# Patient Record
Sex: MALE | Race: WHITE | Employment: FULL TIME | URBAN - METROPOLITAN AREA
[De-identification: names, ages, dates, MRNs, and addresses within clinical notes are randomized per-mention and may not be internally consistent; named-entity substitution may affect disease eponyms.]

---

## 2019-04-11 ENCOUNTER — HOSPITAL ENCOUNTER (EMERGENCY)
Age: 39
Discharge: ANOTHER HOSPITAL | End: 2019-04-12
Attending: EMERGENCY MEDICINE
Payer: COMMERCIAL

## 2019-04-11 ENCOUNTER — APPOINTMENT (OUTPATIENT)
Dept: CT IMAGING | Age: 39
End: 2019-04-11
Attending: EMERGENCY MEDICINE
Payer: COMMERCIAL

## 2019-04-11 DIAGNOSIS — M54.42 ACUTE LEFT-SIDED LOW BACK PAIN WITH LEFT-SIDED SCIATICA: Primary | ICD-10-CM

## 2019-04-11 DIAGNOSIS — M79.2 NEURITIS: ICD-10-CM

## 2019-04-11 LAB
COMMENT, HOLDF: NORMAL
SAMPLES BEING HELD,HOLD: NORMAL

## 2019-04-11 PROCEDURE — 74011000250 HC RX REV CODE- 250: Performed by: EMERGENCY MEDICINE

## 2019-04-11 PROCEDURE — 72131 CT LUMBAR SPINE W/O DYE: CPT

## 2019-04-11 PROCEDURE — 51798 US URINE CAPACITY MEASURE: CPT

## 2019-04-11 PROCEDURE — 96375 TX/PRO/DX INJ NEW DRUG ADDON: CPT

## 2019-04-11 PROCEDURE — 74011250637 HC RX REV CODE- 250/637: Performed by: EMERGENCY MEDICINE

## 2019-04-11 PROCEDURE — 96372 THER/PROPH/DIAG INJ SC/IM: CPT

## 2019-04-11 PROCEDURE — 96374 THER/PROPH/DIAG INJ IV PUSH: CPT

## 2019-04-11 PROCEDURE — 36415 COLL VENOUS BLD VENIPUNCTURE: CPT

## 2019-04-11 PROCEDURE — 96376 TX/PRO/DX INJ SAME DRUG ADON: CPT

## 2019-04-11 PROCEDURE — 74011250636 HC RX REV CODE- 250/636: Performed by: EMERGENCY MEDICINE

## 2019-04-11 PROCEDURE — 99285 EMERGENCY DEPT VISIT HI MDM: CPT

## 2019-04-11 RX ORDER — KETOROLAC TROMETHAMINE 30 MG/ML
30 INJECTION, SOLUTION INTRAMUSCULAR; INTRAVENOUS
Status: COMPLETED | OUTPATIENT
Start: 2019-04-11 | End: 2019-04-11

## 2019-04-11 RX ORDER — METHOCARBAMOL 500 MG/1
750 TABLET, FILM COATED ORAL 4 TIMES DAILY
Status: DISCONTINUED | OUTPATIENT
Start: 2019-04-11 | End: 2019-04-12 | Stop reason: HOSPADM

## 2019-04-11 RX ORDER — OXYCODONE AND ACETAMINOPHEN 5; 325 MG/1; MG/1
1 TABLET ORAL
Status: COMPLETED | OUTPATIENT
Start: 2019-04-11 | End: 2019-04-11

## 2019-04-11 RX ORDER — LIDOCAINE 40 MG/G
CREAM TOPICAL
Status: COMPLETED | OUTPATIENT
Start: 2019-04-11 | End: 2019-04-11

## 2019-04-11 RX ADMIN — LIDOCAINE: 40 CREAM TOPICAL at 23:13

## 2019-04-11 RX ADMIN — KETOROLAC TROMETHAMINE 30 MG: 30 INJECTION, SOLUTION INTRAMUSCULAR at 23:12

## 2019-04-11 RX ADMIN — OXYCODONE AND ACETAMINOPHEN 1 TABLET: 5; 325 TABLET ORAL at 23:13

## 2019-04-11 RX ADMIN — METHOCARBAMOL TABLETS 750 MG: 500 TABLET, COATED ORAL at 23:13

## 2019-04-12 ENCOUNTER — TELEPHONE (OUTPATIENT)
Dept: NEUROLOGY | Age: 39
End: 2019-04-12

## 2019-04-12 VITALS
SYSTOLIC BLOOD PRESSURE: 137 MMHG | HEART RATE: 91 BPM | OXYGEN SATURATION: 97 % | TEMPERATURE: 98.1 F | DIASTOLIC BLOOD PRESSURE: 51 MMHG | HEIGHT: 70 IN | WEIGHT: 165 LBS | RESPIRATION RATE: 16 BRPM | BODY MASS INDEX: 23.62 KG/M2

## 2019-04-12 LAB
ANION GAP BLD CALC-SCNC: 17 MMOL/L (ref 10–20)
BASOPHILS # BLD: 0 K/UL (ref 0–0.1)
BASOPHILS NFR BLD: 0 % (ref 0–1)
BUN BLD-MCNC: 10 MG/DL (ref 9–20)
CA-I BLD-MCNC: 1.16 MMOL/L (ref 1.12–1.32)
CHLORIDE BLD-SCNC: 100 MMOL/L (ref 98–107)
CO2 BLD-SCNC: 27 MMOL/L (ref 21–32)
CREAT BLD-MCNC: 0.8 MG/DL (ref 0.6–1.3)
DIFFERENTIAL METHOD BLD: ABNORMAL
EOSINOPHIL # BLD: 0.1 K/UL (ref 0–0.4)
EOSINOPHIL NFR BLD: 2 % (ref 0–7)
ERYTHROCYTE [DISTWIDTH] IN BLOOD BY AUTOMATED COUNT: 12.3 % (ref 11.5–14.5)
GLUCOSE BLD-MCNC: 110 MG/DL (ref 65–100)
HCT VFR BLD AUTO: 41.9 % (ref 36.6–50.3)
HCT VFR BLD CALC: 44 % (ref 36.6–50.3)
HGB BLD-MCNC: 14.4 G/DL (ref 12.1–17)
LYMPHOCYTES # BLD: 1.2 K/UL
LYMPHOCYTES NFR BLD: 14 % (ref 12–49)
MCH RBC QN AUTO: 30.8 PG (ref 26–34)
MCHC RBC AUTO-ENTMCNC: 34.4 G/DL (ref 30–36.5)
MCV RBC AUTO: 89.5 FL (ref 80–99)
MONOCYTES # BLD: 0.6 K/UL (ref 0–1)
MONOCYTES NFR BLD: 8 % (ref 5–13)
NEUTS SEG # BLD: 6.4 K/UL (ref 1.8–8)
NEUTS SEG NFR BLD: 76 % (ref 32–75)
PLATELET # BLD AUTO: 256 K/UL (ref 150–400)
PMV BLD AUTO: 10.1 FL (ref 8.9–12.9)
POTASSIUM BLD-SCNC: 4 MMOL/L (ref 3.5–5.1)
RBC # BLD AUTO: 4.68 M/UL (ref 4.1–5.7)
SERVICE CMNT-IMP: ABNORMAL
SODIUM BLD-SCNC: 138 MMOL/L (ref 136–145)
WBC # BLD AUTO: 8.4 K/UL (ref 4.1–11.1)

## 2019-04-12 PROCEDURE — 80047 BASIC METABLC PNL IONIZED CA: CPT

## 2019-04-12 PROCEDURE — 85025 COMPLETE CBC W/AUTO DIFF WBC: CPT

## 2019-04-12 PROCEDURE — 74011250636 HC RX REV CODE- 250/636: Performed by: EMERGENCY MEDICINE

## 2019-04-12 PROCEDURE — 36415 COLL VENOUS BLD VENIPUNCTURE: CPT

## 2019-04-12 RX ORDER — MORPHINE SULFATE 4 MG/ML
4 INJECTION INTRAVENOUS ONCE
Status: COMPLETED | OUTPATIENT
Start: 2019-04-12 | End: 2019-04-12

## 2019-04-12 RX ORDER — MORPHINE SULFATE 4 MG/ML
4 INJECTION INTRAVENOUS
Status: COMPLETED | OUTPATIENT
Start: 2019-04-12 | End: 2019-04-12

## 2019-04-12 RX ADMIN — MORPHINE SULFATE 4 MG: 4 INJECTION, SOLUTION INTRAMUSCULAR; INTRAVENOUS at 00:56

## 2019-04-12 RX ADMIN — METHYLPREDNISOLONE SODIUM SUCCINATE 60 MG: 40 INJECTION, POWDER, FOR SOLUTION INTRAMUSCULAR; INTRAVENOUS at 00:53

## 2019-04-12 RX ADMIN — MORPHINE SULFATE 4 MG: 4 INJECTION, SOLUTION INTRAMUSCULAR; INTRAVENOUS at 02:22

## 2019-04-12 NOTE — ED NOTES
Pt placed on monitor x2 after narcotic administration. Family remains at bedside. Pt will continue to be monitored.

## 2019-04-12 NOTE — ROUTINE PROCESS
TRANSFER - OUT REPORT: 
 
Verbal report given to Karla Davis RN on Vicky Wells  being transferred to [facility] ED for routine progression of care Report consisted of patients Situation, Background, Assessment and  
Recommendations(SBAR). Information from the following report(s) SBAR, Kardex, ED Summary, [facility] and Recent Results was reviewed with the receiving nurse. Lines:  
Peripheral IV 04/11/19 Right Antecubital (Active) Site Assessment Clean, dry, & intact 4/11/2019 10:34 PM  
Phlebitis Assessment 0 4/11/2019 10:34 PM  
Infiltration Assessment 0 4/11/2019 10:34 PM  
Dressing Status Clean, dry, & intact 4/11/2019 10:34 PM  
Dressing Type Transparent 4/11/2019 10:34 PM  
Hub Color/Line Status Pink 4/11/2019 10:34 PM  
  
 
Opportunity for questions and clarification was provided.    
 
Patient transported with: 
 20g in rt [facility]

## 2019-04-12 NOTE — TELEPHONE ENCOUNTER
----- Message from Tiago Maddox MD sent at 4/12/2019  7:29 AM EDT -----  Regarding: needs new pt appt  This pt was seen in ED last night with L5 possible neuritis. He was complaining of some pain and numbness but otherwise vitals and exam were totally in tact. I talked to ED doc and said to send him with steroids and we would see him in the office as soon as possible. Do you have any availability? His number is [phone]    Thanks!   Heath Flores

## 2019-04-12 NOTE — TELEPHONE ENCOUNTER
Per Dr. Dilan Ingram, pt may be scheduled on 4/15/19 at [phone] (arrival [phone]). LVM with time if pt is able to come in.

## 2019-04-12 NOTE — ED NOTES
Pt is restless in the bed with pain. Dr. Camilla Hutson at bedside. Family at bedside and call bell within reach.

## 2019-04-12 NOTE — ED NOTES
Pt is resting on stretcher more comfortably with several pillows and medication. Family at bedside. Pt remains on monitor. Call bell within reach.

## 2019-04-12 NOTE — ED NOTES
[facility] requested additional pain medication for pt prior to transport. Pt was given morphine 4 mg via IV. Pt's pain was a 7/10. [facility] will monitor pt's pain response en route.

## 2019-04-12 NOTE — ED NOTES
Bedside, Verbal and Written shift change report given to Mark Man by Monroe Coburn rn. Report included the following information SBAR, Kardex, ED Summary, [facility] and Recent Results.

## 2019-04-12 NOTE — ED NOTES
[facility] at bedside. Emtala, facesheet, summary, triage encounter and pt report given to [facility]. Pt travels with 20g saline lock in rt AC. Karla Davis RN at [address] [place] [address] called and alert of pt's arrival. Pt's CD with Ct results sent with pt. Discharge or Transfer Assessment: Patient A&O x 4and in no distress. Physical re-examination reveals  improvement in pt's condition with reassessment of vital signs completed at the time of admission transfer and/or discharge.

## 2019-04-12 NOTE — ED TRIAGE NOTES
Pt. States history of neck and back pain. Progressed to numbness in his left leg today and all symptoms became acutely worse this evening after mowing the lawn. Took Aleve around 1900 with no relief.

## 2019-04-12 NOTE — ED PROVIDER NOTES
78-year-old male presenting to the ER with complaint of back pain and numbness in the left leg. Patient denies any trauma or injuries to the back. Patient has history of neck and back pain previously with out any history of surgery. Several days ago patient reports having onset of back pain that shoots down the leg with no inciting factors. Patient denies any lifting injuries. Patient now also reporting in addition to shooting pain down leg a constant numbness feeling in the leg. Patient reports being able to walk however feels \"weird\" and difficult at times. No family history of autoimmune disorders. No fevers or chills. No back injections. No bleeding disorders. No past medical history on file. Past Surgical History:  
Procedure Laterality Date  HX HERNIA REPAIR No family history on file. Social History Socioeconomic History  Marital status:  Spouse name: Not on file  Number of children: Not on file  Years of education: Not on file  Highest education level: Not on file Occupational History  Not on file Social Needs  Financial resource strain: Not on file  Food insecurity:  
  Worry: Not on file Inability: Not on file  Transportation needs:  
  Medical: Not on file Non-medical: Not on file Tobacco Use  Smoking status: Never Smoker Substance and Sexual Activity  Alcohol use: Yes Alcohol/week: 3.0 oz Types: 5 Cans of beer per week  Drug use: Never  Sexual activity: Not on file Lifestyle  Physical activity:  
  Days per week: Not on file Minutes per session: Not on file  Stress: Not on file Relationships  Social connections:  
  Talks on phone: Not on file Gets together: Not on file Attends Scientologist service: Not on file Active member of club or organization: Not on file Attends meetings of clubs or organizations: Not on file Relationship status: Not on file  Intimate partner violence:  
  Fear of current or ex partner: Not on file Emotionally abused: Not on file Physically abused: Not on file Forced sexual activity: Not on file Other Topics Concern  Not on file Social History Narrative  Not on file ALLERGIES: Patient has no known allergies. Review of Systems Constitutional: Negative for chills and fever. HENT: Negative for congestion and sore throat. Eyes: Negative for pain. Respiratory: Negative for shortness of breath. Cardiovascular: Negative for chest pain. Gastrointestinal: Negative for abdominal pain, diarrhea, nausea and vomiting. Genitourinary: Negative for dysuria and flank pain. Musculoskeletal: Positive for back pain. Negative for neck pain. Skin: Negative for rash. Neurological: Positive for numbness. Negative for dizziness and headaches. Vitals:  
 04/11/19 2221 BP: (!) 135/91 Pulse: 89 Resp: 18 Temp: 97.6 °F (36.4 °C) SpO2: 97% Weight: 74.8 kg (165 lb) Height: 5' 10\" (1.778 m) Physical Exam  
Constitutional: He is oriented to person, place, and time. He appears distressed. HENT:  
Head: Normocephalic. Mouth/Throat: Oropharynx is clear and moist.  
Eyes: Conjunctivae are normal.  
Neck: Normal range of motion. Neck supple. Cardiovascular: Normal rate and regular rhythm. Pulmonary/Chest: Effort normal and breath sounds normal. No respiratory distress. Abdominal: Soft. Bowel sounds are normal. There is no tenderness. Musculoskeletal: Normal range of motion. Neurological: He is alert and oriented to person, place, and time. He has normal strength. No cranial nerve deficit or sensory deficit (Pt reports diminished in left pain). GCS eye subscore is 4. GCS verbal subscore is 5. GCS motor subscore is 6. He displays no Babinski's sign on the right side. He displays no Babinski's sign on the left side. Reflex Scores: Patellar reflexes are 2+ on the right side and 2+ on the left side. Post void residual: 0mL MDM Number of Diagnoses or Management Options Acute left-sided low back pain with left-sided sciatica:  
Neuritis:  
Diagnosis management comments: 34year-old presenting with back pain shoots down the left leg as well as numbness that is persistent in the leg. No trauma or injuries. CT showing L5 neuritis. No objective findings however patient reports having continued pain. The neurology who recommends MRI inpatient versus outpatient which will be decided based on patient's pain level. Patient has history of prostatic pains or back trauma. Question if this is neuritis as a result of irritation from herniated disc versus other etiology. Patient would benefit from MRI. Patient requesting transfer to [facility] Amount and/or Complexity of Data Reviewed Clinical lab tests: reviewed Tests in the radiology section of CPT®: reviewed ED Course as of Apr 12 0058 Fri Apr 12, 2019  
[id number] Spoke with Dr. Valentino Pick. Recommending steroid taper. Inpatient vs O/P MRI Decision base on pt's pain level. [ZD] [address] [facility] [address] Robin Fitzgerald [ZD] ED Course User Index [ZD] Vikas Blackwood MD  
 
 
Procedures

## 2019-04-15 ENCOUNTER — OFFICE VISIT (OUTPATIENT)
Dept: NEUROLOGY | Age: 39
End: 2019-04-15

## 2019-04-15 VITALS
DIASTOLIC BLOOD PRESSURE: 76 MMHG | RESPIRATION RATE: 16 BRPM | TEMPERATURE: 98.1 F | HEART RATE: 86 BPM | HEIGHT: 70 IN | OXYGEN SATURATION: 97 % | WEIGHT: 165 LBS | BODY MASS INDEX: 23.62 KG/M2 | SYSTOLIC BLOOD PRESSURE: 106 MMHG

## 2019-04-15 DIAGNOSIS — M54.17 LUMBOSACRAL RADICULOPATHY AT L5: Primary | ICD-10-CM

## 2019-04-15 DIAGNOSIS — R20.2 PARESTHESIA: ICD-10-CM

## 2019-04-15 DIAGNOSIS — M54.17 LUMBOSACRAL RADICULOPATHY AT L5: ICD-10-CM

## 2019-04-15 RX ORDER — PREDNISONE 20 MG/1
TABLET ORAL
Qty: 21 TAB | Refills: 0 | OUTPATIENT
Start: 2019-04-15 | End: 2020-08-16

## 2019-04-15 RX ORDER — METHYLPREDNISOLONE 4 MG/1
TABLET ORAL
Refills: 0 | COMMUNITY
Start: 2019-04-12 | End: 2019-04-15 | Stop reason: ALTCHOICE

## 2019-04-15 RX ORDER — IBUPROFEN 600 MG/1
1 TABLET ORAL EVERY 8 HOURS
Refills: 0 | COMMUNITY
Start: 2019-04-12 | End: 2020-08-16

## 2019-04-15 RX ORDER — OXYCODONE AND ACETAMINOPHEN 5; 325 MG/1; MG/1
1 TABLET ORAL
Refills: 0 | COMMUNITY
Start: 2019-04-12 | End: 2020-08-16

## 2019-04-15 NOTE — LETTER
4/15/2019 2:46 PM 
 
Patient:  Gunner Martino YOB: 1980 Date of Visit: 4/15/2019 Dear Eva Grace, [address] [place] [phone] VIA Facsimile: [phone] 
 : Thank you for referring Mr. Gunner Martino to me for EMG/NCS. EMG/ NCS Report [facility] [address], [address] [place] [address] Ph: [phone] FAX: [phone] Test Date:  4/15/2019 Patient: Shellie Olivia : 1980 Physician: Elyssa Rodriguez M.D. Sex: Male Height: ' \" Ref Phys: Nazanin Anderson MD  
ID#: [id number] Weight:  lbs. Technician: Chi Islas Patient History / Exam: On 2019, patient developed sudden L lower back pain with numbness of the left lower extremity. (-) weakness Patient is coming for radiculopathy evalaution. EMG & NCV Findings: 
Evaluation of the left Fibular motor nerve showed normal distal onset latency (4.1 ms), normal amplitude (4.5 mV), normal conduction velocity (B Fib-Ankle, 43 m/s), and normal conduction velocity (Poplt-B Fib, 45 m/s). The left tibial motor nerve showed normal distal onset latency (3.1 ms), normal amplitude (12.4 mV), and normal conduction velocity (Knee-Ankle, 39 m/s). The left Sup Fibular sensory nerve showed normal distal peak latency (2.7 ms), normal amplitude (12.3 µV), and normal conduction velocity (Lower leg-Lat ankle, 45 m/s). The left sural sensory nerve showed normal distal peak latency (3.9 ms) and normal amplitude (10.0 µV). All F Wave latencies were within normal limits. All examined muscles (as indicated in the following table) showed no evidence of electrical instability. Impression: 
 
Extensive electrodiagnostic examination of the left lower extremity is within normal limits. However, study may be too early to detect active denervation from a lumbar radiculopathy. Patient may come back for repeat study more than 3 weeks from onset of symptoms if clinically indicated. Hermila Arriaga MD 
Diplomate, American Board of Psychiatry and Neurology Diplomate, Neuromuscular Medicine Diplomate, [address] [place] [address] Board of Electrodiagnostic Medicine Director, [address] [place] [address] Accredited Laboratory with Exemplary Status Nerve Conduction Studies Anti Sensory Summary Table Stim Site NR Peak (ms) Norm Peak (ms) P-T Amp (µV) Norm P-T Amp Site1 Site2 Dist (cm) Left Sup Fibular Anti Sensory (Lat ankle)  31.2°C Lower leg    2.7 <4.5 12.3 >5 Lower leg Lat ankle 10.0 Left Sural Anti Sensory (Lat Mall)  31.9°C Calf    3.9 <4.5 10.0 >4.0 Calf Lat Mall 14.0 Motor Summary Table Stim Site NR Onset (ms) Norm Onset (ms) O-P Amp (mV) Norm O-P Amp Amp (Prev) (%) Site1 Site2 Dist (cm) Yasmani (m/s) Norm Yasmani (m/s) Left Fibular Motor (Ext Dig Brev)  31.3°C Ankle    4.1 <6.5 4.5 >2.6 100.0 Ankle Ext Dig Brev 8.0 B Fib    11.6  4.1  91.1 B Fib Ankle 32.0 43 >38 Poplt    13.8  3.8  92.7 Poplt B Fib 10.0 45 >42 Left Tibial Motor (Abd Capps Brev)  30.8°C Ankle    3.1 <6.1 12.4 >5.3 100.0 Ankle Abd Capps Brev 8.0 Knee    12.0  9.8  79.0 Knee Ankle 35.0 39 >39 F Wave Studies NR F-Lat (ms) Lat Norm (ms) L-R F-Lat (ms) L-R Lat Norm Left Tibial (Mrkrs) (Abd Hallucis)  30.6°C  
   47.27 <56  <5.7 H Reflex Studies NR H-Lat (ms) L-R H-Lat (ms) L-R Lat Norm Left Tibial (Gastroc)  30.6°C  
   40.00  <2.0 EMG Side Muscle Nerve Root Ins Act Fibs Psw Recrt Duration Amp Poly Comment Left Ext Dig Brev Dp Br Peron L5, S1 Nml Nml Nml Nml Nml Nml Nml Left AbdHallucis MedPlantar S1-2 Nml Nml Nml Nml Nml Nml Nml Left AntTibialis Dp Br Peron L4-5 Nml Nml Nml Nml Nml Nml Nml Left MedGastroc Tibial S1-2 Nml Nml Nml Nml Nml Nml Nml Left VastusLat Femoral L2-4 Nml Nml Nml Nml Nml Nml Nml Left GluteusMed SupGluteal L4-S1 Nml Nml Nml Nml Nml Nml Nml   
 Left Lower Lumb Parasp Rami L5,S1 Nml Nml Nml Nml Nml Nml Nml Nerve Conduction Studies Anti Sensory Left/Right Comparison Stim Site L Lat (ms) R Lat (ms) L-R Lat (ms) L Amp (µV) R Amp (µV) L-R Amp (%) Site1 Site2 L Yasmani (m/s) R Yasmani (m/s) L-R Yasmani (m/s) Sup Fibular Anti Sensory (Lat ankle)  31.2°C Lower leg 2.2   12.3   Lower leg Lat ankle 45 Sural Anti Sensory (Lat Mall)  31.9°C Calf 3.3   10.0   Calf Lat Mall 42 Motor Left/Right Comparison Stim Site L Lat (ms) R Lat (ms) L-R Lat (ms) L Amp (mV) R Amp (mV) L-R Amp (%) Site1 Site2 L Yasmani (m/s) R Yasmani (m/s) L-R Yasmani (m/s) Fibular Motor (Ext Dig Brev)  31.3°C Ankle 4.1   4.5   Ankle Ext Dig Brev     
B Fib 11.6   4.1   B Fib Ankle 43 Poplt 13.8   3.8   Poplt B Fib 45 Tibial Motor (Abd Capps Brev)  30.8°C Ankle 3.1   12.4   Ankle Abd Capps Brev     
Knee 12.0   9.8   Knee Ankle 39 Waveforms:

## 2019-04-15 NOTE — PROGRESS NOTES
Chief Complaint Patient presents with  New Patient  Leg Pain  
  back pain radiating down left leg, seen at ED on 4/11/19.  still has significant pain Has ortho appt scheduled for 4/16/19 with Dr. Matthew Ingram.   Had MRI at [facility]

## 2019-04-15 NOTE — PROGRESS NOTES
[facility] and [address] at [facility] at [address] [place], [address] [address] 
[phone] Office 
[phone] Referring: Karla Rodriguez DO Chief Complaint Patient presents with  New Patient  Leg Pain  
  back pain radiating down left leg, seen at ED on 4/11/19.  still has significant pain 15-year-old right-handed gentleman who comes today for evaluation of what he calls leg numbness and herniated disc. I did discuss the case with Dr. Enrique Sánchez as well as she was called by the emergency department regarding this patient. Apparently presented to the emergency department on April 11, 2019 with complaints of low back pain. He was also having numbness in his left leg. Patient was apparently transferred to [facility] and this is documented in the notes. He had a CT scan of the lumbar spine done that demonstrated a disc protrusion at L5-S1 with what is called left L5 neuritis. 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 He notes that he had some low back pain over the last couple weeks or so and then a couple days prior to going to the emergency department he awakened and had low back pain radiating into the left lower extremity. He notes that he went to his meeting at work and not sit down because of the but the discomfort. He notes he took some Aleve and that helped. He went home later that afternoon and cut grass and when he went in to have dinner he says he could barely do anything. He could not sit when he could not lay down. He tried to stretch. He took a hot shower. Nothing was relieving the pain.   He had pain in the low back left buttock region radiating down the back and side of the left lower extremity into the foot. His leg went numb. He came to the emergency department here at [facility] with a did CT scan and told him that he needed an MRI and they were going to admit him to [address] [place] [address] sounds like but [address] [place] [address] was full so they transferred him to [facility] where he had an MRI done there in the emergency department and they gave him a Medrol Dosepak and arrange for him to see Dr. Kameron Mcgowan and he has an appointment tomorrow. He was told he had a herniated disc at L5. He continues to have pain down the left leg. It hurts to walk. No focal weakness. No loss of bowel or bladder. No chest pain fever chills nausea vomiting. Past Medical History:  
Diagnosis Date  Herniated lumbar intervertebral disc  Psoriasis Past Surgical History:  
Procedure Laterality Date  HX HERNIA REPAIR Current Outpatient Medications Medication Sig Dispense Refill  methylPREDNISolone (MEDROL DOSEPACK) 4 mg tablet Take  by mouth.  0  
 ibuprofen (MOTRIN) 600 mg tablet Take 1 Tab by mouth every eight (8) hours. 0  
 oxyCODONE-acetaminophen (PERCOCET) 5-325 mg per tablet Take 1 Tab by mouth every four (4) hours as needed. 0 No Known Allergies Social History Tobacco Use  Smoking status: Former Smoker  Smokeless tobacco: Never Used  Tobacco comment: quit approx 13 yrs ago Substance Use Topics  Alcohol use: Yes Alcohol/week: 3.0 oz Types: 5 Cans of beer per week  Drug use: Never Family History Problem Relation Age of Onset  No Known Problems Mother  Hypertension Father  Clotting Disorder Father Review of Systems Pertinent positives and negatives as noted with remainder of comprehensive review negative Examination Visit Vitals /76 (BP 1 Location: Left arm, BP Patient Position: Sitting) Pulse 86 Temp 98.1 °F (36.7 °C) (Oral) Resp 16 Ht 5' 10\" (1.778 m) Wt 74.8 kg (165 lb) SpO2 97% BMI 23.68 kg/m² Pleasant, well appearing. Dress and grooming are appropriate. No scleral icterus is present. Oropharynx is clear. Supple neck without bruit appreciated. Heart regular. Pulses are symmetrical.  No edema in the lower extremities but the left foot is red and he does have good pulses. Neurologically, he is awake, alert, oriented with normal speech, language, and cognition. Pupils react bilaterally. He has full versions without nystagmus. Face is symmetrical with symmetrical facial sensation. Tongue and palate are midline. Shoulder shrug is symmetrical. Hearing is intact to conversation. He has normal bulk and tone. There is no abnormal movement. There is no pronation or drift. He is able to generate full strength in the upper and lower extremities and all muscle groups to direct confrontational testing although he does have giveaway secondary to discomfort in the left lower extremity but he does in that extremity have the ability to generate full power before giving way due to pain. Reflexes are symmetrical in the upper and lower extremities bilaterally. His toes are down going. There is no Guillory. Finger nose finger and rapid alternating movements are normal. He has no ataxia or past pointing. Sensory examination is intact to primary modalities and there is no lateralization of sensation. .There is no extinction. He is able to ambulate although he favors the left leg. He has a steppage type gait but more secondary to discomfort. He is able to get onto his heels but it is painful for him to do so. Impression/Plan Left lumbar radicular pain with imaging demonstrative of left L5 radiculitis and by his report herniated nucleus pulposus at L5. He continues to have significant discomfort at this point and is seen orthopedics tomorrow.   We have fit him in today to get an EMG of the lower extremity to see if we can correlate that electrically although this may be too early and that it can take 2-3 weeks to see changes on EMG. Discussed that it is soon after symptom onset and we may not see such but we will try to get that information for the surgeon to have tomorrow. Also asked him to go by [facility] and get his image burned on disc so he will have that tomorrow. I am going to change his Medrol Dosepak over to prednisone and see if that at a higher dose will make him more comfortable. We will leave follow-up open here as this appears to be more of a surgical issue. Ranjeet Nayak MD 
 
This note was created using voice recognition software. Despite editing, there may be syntax errors. This note will not be viewable in [address] Addendum 2:33 PM 
Patient has completed his EMG. I spoke with Dr. Daiana Spencer and the EMG did not demonstrate any sign of radiculopathy but as we discussed above this is likely too early. Patient understands. He will keep his appointment with orthopedics tomorrow. We will forward the EMG report over to Dr. Edel Carbajal office.  
 
Ranjeet Nayak MD

## 2019-04-15 NOTE — TELEPHONE ENCOUNTER
----- Message from [facility] sent at 4/15/2019  9:31 AM EDT -----  Regarding: Dr Napoles/ telephone  Mercedes Jara, wife, returned the call from Friday and is requesting another.       Best contact number is [phone]

## 2019-04-15 NOTE — PROCEDURES
EMG/ NCS Report  DRUG REHABILITATION  - [facility]  [facility]  [facility], [address]   [place], [address]   Ph: [phone]   FAX: [phone]/ [phone]  Test Date:  4/15/2019    Patient: Javy Spine : 1980 Physician: Mery Merrill M.D. Sex: Male Height: ' \" Ref Phys: Linda Padilla MD   ID#: [id number] Weight:  lbs. Technician: Edna Yusuf     Patient History / Exam:  On 2019, patient developed sudden L lower back pain with numbness of the left lower extremity. (-) weakness    Patient is coming for radiculopathy evalaution. EMG & NCV Findings:  Evaluation of the left Fibular motor nerve showed normal distal onset latency (4.1 ms), normal amplitude (4.5 mV), normal conduction velocity (B Fib-Ankle, 43 m/s), and normal conduction velocity (Poplt-B Fib, 45 m/s). The left tibial motor nerve showed normal distal onset latency (3.1 ms), normal amplitude (12.4 mV), and normal conduction velocity (Knee-Ankle, 39 m/s). The left Sup Fibular sensory nerve showed normal distal peak latency (2.7 ms), normal amplitude (12.3 µV), and normal conduction velocity (Lower leg-Lat ankle, 45 m/s). The left sural sensory nerve showed normal distal peak latency (3.9 ms) and normal amplitude (10.0 µV). All F Wave latencies were within normal limits. All examined muscles (as indicated in the following table) showed no evidence of electrical instability. Impression:    Extensive electrodiagnostic examination of the left lower extremity is within normal limits. However, study may be too early to detect active denervation from a lumbar radiculopathy. Patient may come back for repeat study more than 3 weeks from onset of symptoms if clinically indicated.       Mery Merrill MD  Diplomate, American Board of Psychiatry and Neurology  Diplomate, Neuromuscular Medicine  Diplomate, American Board of Electrodiagnostic Medicine  Director, [facility] Accredited Laboratory with Exemplary Status          Nerve Conduction Studies  Anti Sensory Summary Table     Stim Site NR Peak (ms) Norm Peak (ms) P-T Amp (µV) Norm P-T Amp Site1 Site2 Dist (cm)   Left Sup Fibular Anti Sensory (Lat ankle)  31.2°C   Lower leg    2.7 <4.5 12.3 >5 Lower leg Lat ankle 10.0   Left Sural Anti Sensory (Lat Mall)  31.9°C   Calf    3.9 <4.5 10.0 >4.0 Calf Lat Mall 14.0     Motor Summary Table     Stim Site NR Onset (ms) Norm Onset (ms) O-P Amp (mV) Norm O-P Amp Amp (Prev) (%) Site1 Site2 Dist (cm) Yasmani (m/s) Norm Yasmani (m/s)   Left Fibular Motor (Ext Dig Brev)  31.3°C   Ankle    4.1 <6.5 4.5 >2.6 100.0 Ankle Ext Dig Brev 8.0     B Fib    11.6  4.1  91.1 B Fib Ankle 32.0 43 >38   Poplt    13.8  3.8  92.7 Poplt B Fib 10.0 45 >42   Left Tibial Motor (Abd Capps Brev)  30.8°C   Ankle    3.1 <6.1 12.4 >5.3 100.0 Ankle Abd Capps Brev 8.0     Knee    12.0  9.8  79.0 Knee Ankle 35.0 39 >39     F Wave Studies     NR F-Lat (ms) Lat Norm (ms) L-R F-Lat (ms) L-R Lat Norm   Left Tibial (Mrkrs) (Abd Hallucis)  30.6°C      47.27 <56  <5.7     H Reflex Studies     NR H-Lat (ms) L-R H-Lat (ms) L-R Lat Norm   Left Tibial (Gastroc)  30.6°C      40.00  <2.0     EMG     Side Muscle Nerve Root Ins Act Fibs Psw Recrt Duration Amp Poly Comment   Left Ext Dig Brev Dp Br Peron L5, S1 Nml Nml Nml Nml Nml Nml Nml    Left AbdHallucis MedPlantar S1-2 Nml Nml Nml Nml Nml Nml Nml    Left AntTibialis Dp Br Peron L4-5 Nml Nml Nml Nml Nml Nml Nml    Left MedGastroc Tibial S1-2 Nml Nml Nml Nml Nml Nml Nml    Left VastusLat Femoral L2-4 Nml Nml Nml Nml Nml Nml Nml    Left GluteusMed SupGluteal L4-S1 Nml Nml Nml Nml Nml Nml Nml    Left Lower Lumb Parasp Rami L5,S1 Nml Nml Nml Nml Nml Nml Nml                Nerve Conduction Studies  Anti Sensory Left/Right Comparison     Stim Site L Lat (ms) R Lat (ms) L-R Lat (ms) L Amp (µV) R Amp (µV) L-R Amp (%) Site1 Site2 L Yasmani (m/s) R Yasmani (m/s) L-R Yasmani (m/s)   Sup Fibular Anti Sensory (Lat ankle)  31.2°C Lower leg 2.2   12.3   Lower leg Lat ankle 45     Sural Anti Sensory (Lat Mall)  31.9°C   Calf 3.3   10.0   Calf Lat Mall 42       Motor Left/Right Comparison     Stim Site L Lat (ms) R Lat (ms) L-R Lat (ms) L Amp (mV) R Amp (mV) L-R Amp (%) Site1 Site2 L Yasmani (m/s) R Yasmani (m/s) L-R Yasmani (m/s)   Fibular Motor (Ext Dig Brev)  31.3°C   Ankle 4.1   4.5   Ankle Ext Dig Brev      B Fib 11.6   4.1   B Fib Ankle 43     Poplt 13.8   3.8   Poplt B Fib 45     Tibial Motor (Abd Capps Brev)  30.8°C   Ankle 3.1   12.4   Ankle Abd Capps Brev      Knee 12.0   9.8   Knee Ankle 39           Waveforms:

## 2020-08-16 ENCOUNTER — HOSPITAL ENCOUNTER (EMERGENCY)
Dept: GENERAL RADIOLOGY | Age: 40
Discharge: HOME | End: 2020-08-16
Attending: EMERGENCY MEDICINE
Payer: COMMERCIAL

## 2020-08-16 ENCOUNTER — HOSPITAL ENCOUNTER (EMERGENCY)
Dept: CT IMAGING | Age: 40
Discharge: HOME | End: 2020-08-16
Attending: EMERGENCY MEDICINE
Payer: COMMERCIAL

## 2020-08-16 ENCOUNTER — HOSPITAL ENCOUNTER (EMERGENCY)
Age: 40
Discharge: HOME | End: 2020-08-16
Attending: EMERGENCY MEDICINE
Payer: COMMERCIAL

## 2020-08-16 VITALS
BODY MASS INDEX: 26.12 KG/M2 | HEART RATE: 79 BPM | RESPIRATION RATE: 18 BRPM | DIASTOLIC BLOOD PRESSURE: 67 MMHG | TEMPERATURE: 98.4 F | OXYGEN SATURATION: 97 % | HEIGHT: 69 IN | WEIGHT: 176.37 LBS | SYSTOLIC BLOOD PRESSURE: 126 MMHG

## 2020-08-16 DIAGNOSIS — R93.0 ABNORMAL CT SCAN, SINUS: ICD-10-CM

## 2020-08-16 DIAGNOSIS — S29.019A THORACIC MYOFASCIAL STRAIN, INITIAL ENCOUNTER: ICD-10-CM
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
## 2020-08-16 DIAGNOSIS — S39.012A STRAIN OF LUMBAR REGION, INITIAL ENCOUNTER: ICD-10-CM

## 2020-08-16 DIAGNOSIS — S20.211A CONTUSION OF RIGHT CHEST WALL, INITIAL ENCOUNTER: ICD-10-CM

## 2020-08-16 PROCEDURE — 71101 X-RAY EXAM UNILAT RIBS/CHEST: CPT

## 2020-08-16 PROCEDURE — 72131 CT LUMBAR SPINE W/O DYE: CPT

## 2020-08-16 PROCEDURE — 70450 CT HEAD/BRAIN W/O DYE: CPT

## 2020-08-16 PROCEDURE — 74011250637 HC RX REV CODE- 250/637: Performed by: EMERGENCY MEDICINE

## 2020-08-16 PROCEDURE — 99284 EMERGENCY DEPT VISIT MOD MDM: CPT

## 2020-08-16 PROCEDURE — 72070 X-RAY EXAM THORAC SPINE 2VWS: CPT

## 2020-08-16 RX ORDER — NAPROXEN 500 MG/1
500 TABLET ORAL 2 TIMES DAILY WITH MEALS
Qty: 30 TAB | Refills: 1 | Status: SHIPPED | OUTPATIENT
Start: 2020-08-16

## 2020-08-16 RX ORDER — OXYCODONE AND ACETAMINOPHEN 5; 325 MG/1; MG/1
1 TABLET ORAL ONCE
Status: COMPLETED | OUTPATIENT
Start: 2020-08-16 | End: 2020-08-16

## 2020-08-16 RX ORDER — HYDROCODONE BITARTRATE AND ACETAMINOPHEN 5; 325 MG/1; MG/1
1 TABLET ORAL
Qty: 12 TAB | Refills: 0 | Status: SHIPPED | OUTPATIENT
Start: 2020-08-16 | End: 2020-08-19

## 2020-08-16 RX ORDER — NAPROXEN 250 MG/1
500 TABLET ORAL
Status: COMPLETED | OUTPATIENT
Start: 2020-08-16 | End: 2020-08-16

## 2020-08-16 RX ADMIN — NAPROXEN 500 MG: 250 TABLET ORAL at 15:43

## 2020-08-16 RX ADMIN — OXYCODONE HYDROCHLORIDE AND ACETAMINOPHEN 1 TABLET: 5; 325 TABLET ORAL at 15:44

## 2020-08-16 NOTE — ED TRIAGE NOTES
Pt ambulates to treatment area he states that about 1 1/2 hours ago he was coming down the stairs and slipped on some black mold falling onto his right side injuring his mid to lower ribs and back. He has a red omi to the mid right back. States that it is difficult to take a deep breath.

## 2020-12-05 ENCOUNTER — HOSPITAL ENCOUNTER (EMERGENCY)
Age: 40
Discharge: HOME | End: 2020-12-05
Attending: EMERGENCY MEDICINE | Admitting: EMERGENCY MEDICINE
Payer: COMMERCIAL

## 2020-12-05 ENCOUNTER — APPOINTMENT (OUTPATIENT)
Dept: GENERAL RADIOLOGY | Age: 40
End: 2020-12-05
Attending: EMERGENCY MEDICINE
Payer: COMMERCIAL

## 2020-12-05 VITALS
TEMPERATURE: 97.7 F | BODY MASS INDEX: 27.13 KG/M2 | WEIGHT: 183.2 LBS | HEART RATE: 79 BPM | SYSTOLIC BLOOD PRESSURE: 142 MMHG | OXYGEN SATURATION: 99 % | RESPIRATION RATE: 18 BRPM | HEIGHT: 69 IN | DIASTOLIC BLOOD PRESSURE: 65 MMHG

## 2020-12-05 DIAGNOSIS — S61.219A LACERATION OF MULTIPLE SITES OF RIGHT HAND AND FINGERS, INITIAL ENCOUNTER: Primary | ICD-10-CM

## 2020-12-05 DIAGNOSIS — S61.411A LACERATION OF MULTIPLE SITES OF RIGHT HAND AND FINGERS, INITIAL ENCOUNTER: Primary | ICD-10-CM

## 2020-12-05 PROCEDURE — 75810000293 HC SIMP/SUPERF WND  RPR

## 2020-12-05 PROCEDURE — 74011000250 HC RX REV CODE- 250: Performed by: EMERGENCY MEDICINE

## 2020-12-05 PROCEDURE — 73130 X-RAY EXAM OF HAND: CPT

## 2020-12-05 PROCEDURE — 99283 EMERGENCY DEPT VISIT LOW MDM: CPT

## 2020-12-05 PROCEDURE — 75810000053 HC SPLINT APPLICATION

## 2020-12-05 RX ORDER — LIDOCAINE HYDROCHLORIDE 10 MG/ML
15 INJECTION, SOLUTION EPIDURAL; INFILTRATION; INTRACAUDAL; PERINEURAL ONCE
Status: COMPLETED | OUTPATIENT
Start: 2020-12-05 | End: 2020-12-05

## 2020-12-05 RX ADMIN — LIDOCAINE HYDROCHLORIDE 15 ML: 10 INJECTION, SOLUTION EPIDURAL; INFILTRATION; INTRACAUDAL; PERINEURAL at 13:01

## 2020-12-05 NOTE — ED NOTES
Patient discharged home in stable condition by Dr Emmett Baker. Instructions given and signed by the patient.

## 2020-12-05 NOTE — ED PROVIDER NOTES
Date of Service:  12/5/2020    Patient:  Courtney Roberts    Chief Complaint:  Hand Pain       HPI:  Courtney Roberts is a 41 y.o.  male who presents for evaluation of hand injury. Patient is right-hand dominant. He was using a power tool when his hand got hit by the guard on a miter saw. Comes in with a laceration to the right thumb second and third digit. He denies numbness or tingling. His tetanus is up-to-date. He otherwise denies any other acute complaints or modifying factors. Pain is located in this area, 5 out of 10. Past Medical History:   Diagnosis Date    Herniated lumbar intervertebral disc     Psoriasis        Past Surgical History:   Procedure Laterality Date    HX HERNIA REPAIR           Family History:   Problem Relation Age of Onset    No Known Problems Mother     Hypertension Father     Clotting Disorder Father        Social History     Socioeconomic History    Marital status:      Spouse name: Not on file    Number of children: Not on file    Years of education: Not on file    Highest education level: Not on file   Occupational History    Not on file   Social Needs    Financial resource strain: Not on file    Food insecurity     Worry: Not on file     Inability: Not on file    Transportation needs     Medical: Not on file     Non-medical: Not on file   Tobacco Use    Smoking status: Former Smoker    Smokeless tobacco: Never Used    Tobacco comment: quit approx 13 yrs ago   Substance and Sexual Activity    Alcohol use:  Yes     Alcohol/week: 5.0 standard drinks     Types: 5 Cans of beer per week    Drug use: Never    Sexual activity: Not on file   Lifestyle    Physical activity     Days per week: Not on file     Minutes per session: Not on file    Stress: Not on file   Relationships    Social connections     Talks on phone: Not on file     Gets together: Not on file     Attends Taoist service: Not on file     Active member of club or organization: Not on file     Attends meetings of clubs or organizations: Not on file     Relationship status: Not on file    Intimate partner violence     Fear of current or ex partner: Not on file     Emotionally abused: Not on file     Physically abused: Not on file     Forced sexual activity: Not on file   Other Topics Concern    Not on file   Social History Narrative    Not on file         ALLERGIES: Patient has no known allergies. Review of Systems   Respiratory: Negative for shortness of breath. Cardiovascular: Negative for chest pain. Gastrointestinal: Negative for abdominal pain. Musculoskeletal:        Right hand injury   Skin: Positive for wound. Neurological: Negative for weakness and numbness. Hematological: Does not bruise/bleed easily. Psychiatric/Behavioral: Negative for confusion. There were no vitals filed for this visit. Physical Exam  Vitals signs and nursing note reviewed. Constitutional:       Appearance: Normal appearance. HENT:      Head: Normocephalic and atraumatic. Eyes:      General: No scleral icterus. Cardiovascular:      Rate and Rhythm: Normal rate. Pulses: Normal pulses. Pulmonary:      Effort: Pulmonary effort is normal. No respiratory distress. Abdominal:      General: Abdomen is flat. Musculoskeletal: Normal range of motion. General: Tenderness present. No deformity. Skin:     General: Skin is warm. Comments: 2 cm circular laceration across the pad of the right thumb. 2 cm lacerations on the second and third digit overlying the interphalangeal joints   Neurological:      Mental Status: He is alert. Sensory: No sensory deficit. Motor: No weakness.    Psychiatric:         Mood and Affect: Mood normal.          MDM  Number of Diagnoses or Management Options  Laceration of multiple sites of right hand and fingers, initial encounter:         VITAL SIGNS:  Patient Vitals for the past 4 hrs:   Temp Pulse Resp BP SpO2   12/05/20 1300     99 %   12/05/20 1257 97.7 °F (36.5 °C) 73 16 132/82 98 %         LABS:  No results found for this or any previous visit (from the past 6 hour(s)). IMAGING:  XR HAND RT MIN 3 V   Final Result   IMPRESSION:   1. Soft tissue injuries along the ulnar aspect of second and third digits   without evidence of radiopaque foreign body in the soft tissues. 2.  No fracture. Medications During Visit:  Medications   lidocaine (PF) (XYLOCAINE) 10 mg/mL (1 %) injection 15 mL (15 mL SubCUTAneous Given by Provider 12/5/20 1301)         DECISION MAKING:  Alexandro Henriquez is a 41 y.o. male who comes in as above. Here, imaging is unremarkable for acute fracture. Patient has multiple lacerations to the and the second and third digit of his right dominant hand. Wounds are anesthetized, cleaned and repaired, see procedure note below. At this time the patient's wounds are dressed he is placed in a splint for safety and asked to follow-up with his PCP and orthopedic hand specialist next week for follow-up. Patient is agreeable to this plan. All questions answered. Tetanus is up-to-date      IMPRESSION:  1. Laceration of multiple sites of right hand and fingers, initial encounter        DISPOSITION:  Discharged      Current Discharge Medication List           Follow-up Information     Follow up With Specialties Details Why Contact Graciela Leos DO Family Medicine Schedule an appointment as soon as possible for a visit  For wound re-check, For suture removal 7 days [address] [facility] [address] [facility]      Azam Brary MD Orthopedic Surgery Call  As needed [address] [facility]  [address] [place] [address]  [phone]              The patient is asked to follow-up with their primary care provider in the next several days. They are to call tomorrow for an appointment. The patient is asked to return promptly for any increased concerns or worsening of symptoms.   They can return to this emergency department or any other emergency department. Wound Closure by Adhesive    Date/Time: 12/5/2020 2:08 PM  Performed by: Clayton Holland DO  Authorized by: Clayton Holland DO     Consent:     Consent obtained:  Verbal    Consent given by:  Patient    Risks discussed:  Infection, pain, poor cosmetic result, tendon damage and nerve damage  Anesthesia (see MAR for exact dosages): Anesthesia method:  Nerve block    Block location:  Base of all three fingers    Block needle gauge:  27 G    Block anesthetic:  Lidocaine 1% w/o epi    Block technique:  Digital    Block injection procedure:  Anatomic landmarks identified, introduced needle, incremental injection, negative aspiration for blood and anatomic landmarks palpated    Block outcome:  Anesthesia achieved (of 1-3rd digit)  Laceration details:     Location:  Finger    Finger location: Right thumn, second and thrid digit. Wound length (cm): Right thumb - mascerated, 2nd digit 1cm, 3rd digit - 2cm. Depth (mm):  2  Repair type:     Repair type: Intermediate  Exploration:     Hemostasis achieved with:  Direct pressure    Wound exploration: wound explored through full range of motion and entire depth of wound probed and visualized      Wound extent: no muscle damage noted, no tendon damage noted and no underlying fracture noted      Contaminated: no    Treatment:     Area cleansed with:  Kailee-Fatimah    Amount of cleaning:  Standard    Irrigation solution:  Sterile saline    Irrigation volume:  1L    Irrigation method:  Pressure wash    Visualized foreign bodies/material removed: no    Skin repair:     Repair method:  Sutures    Suture size:  5-0    Suture material:  Nylon    Suture technique:  Simple interrupted    Number of sutures: third digit - 7, second digit - 3.   Approximation:     Approximation:  Close  Post-procedure details:     Dressing:  Non-adherent dressing and splint for protection    Patient tolerance of procedure:   Tolerated well, no immediate complications

## 2020-12-05 NOTE — ED TRIAGE NOTES
Pt ambulates to treatment area he states that he was using a miter box and the guard slipped pulling his right hand into the box. He has lacerations on the middle, index and thumb with moderate bleeding.   Dr Graham Bates at the bedside

## 2021-09-20 ENCOUNTER — APPOINTMENT (OUTPATIENT)
Dept: CT IMAGING | Age: 41
End: 2021-09-20
Attending: STUDENT IN AN ORGANIZED HEALTH CARE EDUCATION/TRAINING PROGRAM
Payer: COMMERCIAL

## 2021-09-20 ENCOUNTER — HOSPITAL ENCOUNTER (EMERGENCY)
Age: 41
Discharge: OTHER INSTITUTION | End: 2021-09-20
Attending: STUDENT IN AN ORGANIZED HEALTH CARE EDUCATION/TRAINING PROGRAM
Payer: COMMERCIAL

## 2021-09-20 ENCOUNTER — APPOINTMENT (OUTPATIENT)
Dept: GENERAL RADIOLOGY | Age: 41
End: 2021-09-20
Attending: STUDENT IN AN ORGANIZED HEALTH CARE EDUCATION/TRAINING PROGRAM
Payer: COMMERCIAL

## 2021-09-20 VITALS
DIASTOLIC BLOOD PRESSURE: 64 MMHG | RESPIRATION RATE: 18 BRPM | SYSTOLIC BLOOD PRESSURE: 132 MMHG | BODY MASS INDEX: 25.85 KG/M2 | TEMPERATURE: 98 F | HEIGHT: 70 IN | WEIGHT: 180.56 LBS | HEART RATE: 71 BPM | OXYGEN SATURATION: 98 %

## 2021-09-20 DIAGNOSIS — S22.31XA CLOSED FRACTURE OF ONE RIB OF RIGHT SIDE, INITIAL ENCOUNTER: ICD-10-CM

## 2021-09-20 DIAGNOSIS — S22.42XA CLOSED FRACTURE OF MULTIPLE RIBS OF LEFT SIDE, INITIAL ENCOUNTER: ICD-10-CM

## 2021-09-20 DIAGNOSIS — S42.102A CLOSED FRACTURE OF LEFT SCAPULA, UNSPECIFIED PART OF SCAPULA, INITIAL ENCOUNTER: Primary | ICD-10-CM

## 2021-09-20 LAB
ANION GAP BLD CALC-SCNC: 10 MMOL/L (ref 10–20)
ATRIAL RATE: 76 BPM
CA-I BLD-MCNC: 1.17 MMOL/L (ref 1.12–1.32)
CALCULATED P AXIS, ECG09: 38 DEGREES
CALCULATED R AXIS, ECG10: 41 DEGREES
CALCULATED T AXIS, ECG11: 18 DEGREES
CHLORIDE BLD-SCNC: 103 MMOL/L (ref 98–107)
CO2 BLD-SCNC: 26.5 MMOL/L (ref 21–32)
CREAT BLD-MCNC: 0.67 MG/DL (ref 0.6–1.3)
DIAGNOSIS, 93000: NORMAL
GLUCOSE BLD-MCNC: 116 MG/DL (ref 65–100)
P-R INTERVAL, ECG05: 158 MS
POTASSIUM BLD-SCNC: 4.1 MMOL/L (ref 3.5–5.1)
Q-T INTERVAL, ECG07: 358 MS
QRS DURATION, ECG06: 84 MS
QTC CALCULATION (BEZET), ECG08: 402 MS
SERVICE CMNT-IMP: ABNORMAL
SODIUM BLD-SCNC: 138 MMOL/L (ref 136–145)
VENTRICULAR RATE, ECG03: 76 BPM

## 2021-09-20 PROCEDURE — 71046 X-RAY EXAM CHEST 2 VIEWS: CPT

## 2021-09-20 PROCEDURE — 74011250636 HC RX REV CODE- 250/636: Performed by: STUDENT IN AN ORGANIZED HEALTH CARE EDUCATION/TRAINING PROGRAM

## 2021-09-20 PROCEDURE — 93005 ELECTROCARDIOGRAM TRACING: CPT

## 2021-09-20 PROCEDURE — 99284 EMERGENCY DEPT VISIT MOD MDM: CPT

## 2021-09-20 PROCEDURE — 71260 CT THORAX DX C+: CPT

## 2021-09-20 PROCEDURE — 80047 BASIC METABLC PNL IONIZED CA: CPT

## 2021-09-20 PROCEDURE — 74011250637 HC RX REV CODE- 250/637: Performed by: STUDENT IN AN ORGANIZED HEALTH CARE EDUCATION/TRAINING PROGRAM

## 2021-09-20 PROCEDURE — 70450 CT HEAD/BRAIN W/O DYE: CPT

## 2021-09-20 PROCEDURE — 96374 THER/PROPH/DIAG INJ IV PUSH: CPT

## 2021-09-20 PROCEDURE — 74011000636 HC RX REV CODE- 636: Performed by: STUDENT IN AN ORGANIZED HEALTH CARE EDUCATION/TRAINING PROGRAM

## 2021-09-20 RX ORDER — HYDROMORPHONE HYDROCHLORIDE 1 MG/ML
1 INJECTION, SOLUTION INTRAMUSCULAR; INTRAVENOUS; SUBCUTANEOUS
Status: COMPLETED | OUTPATIENT
Start: 2021-09-20 | End: 2021-09-20

## 2021-09-20 RX ORDER — OXYCODONE AND ACETAMINOPHEN 5; 325 MG/1; MG/1
1 TABLET ORAL
Status: COMPLETED | OUTPATIENT
Start: 2021-09-20 | End: 2021-09-20

## 2021-09-20 RX ADMIN — HYDROMORPHONE HYDROCHLORIDE 1 MG: 1 INJECTION, SOLUTION INTRAMUSCULAR; INTRAVENOUS; SUBCUTANEOUS at 15:01

## 2021-09-20 RX ADMIN — IOPAMIDOL 100 ML: 612 INJECTION, SOLUTION INTRAVENOUS at 13:33

## 2021-09-20 RX ADMIN — OXYCODONE HYDROCHLORIDE AND ACETAMINOPHEN 1 TABLET: 5; 325 TABLET ORAL at 11:23

## 2021-09-20 NOTE — ED NOTES
Patient resting with HOB elevated; call bell within reach. Dr. Caro waiting for transfer center response.

## 2021-09-20 NOTE — ED NOTES
Discharge or Transfer Assessment: Patient A&O x4 and in no distress. Physical re-examination reveals some improvement in pts condition with reassessment of vital signs completed at the time of admission transfer and/or discharge. Verification of hospital location [facility]  and room ADULT ED completed with EMS provider [facility].

## 2021-09-20 NOTE — ED NOTES
TRANSFER - OUT REPORT:    Verbal report given to Linda on Teresa Anchors  being transferred to [facility] Adult ED(unit) for routine progression of care       Report consisted of patients Situation, Background, Assessment and   Recommendations(SBAR). Information from the following report(s) SBAR, ED Summary, Intake/Output, MAR and Recent Results was reviewed with the receiving nurse. Lines:   Peripheral IV 09/20/21 Left Antecubital (Active)   Site Assessment Clean, dry, & intact 09/20/21 1306   Phlebitis Assessment 0 09/20/21 1306   Infiltration Assessment 0 09/20/21 1306   Dressing Status Clean, dry, & intact 09/20/21 1306   Dressing Type Transparent;Tape 09/20/21 1306   Hub Color/Line Status Pink;Patent; Flushed 09/20/21 1306   Action Taken Blood drawn 09/20/21 1306        Opportunity for questions and clarification was provided.       Patient transported with:   [facility]      [facility] transport/ pulse ox monitor
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
## 2023-05-23 RX ORDER — NAPROXEN 500 MG/1
500 TABLET ORAL 2 TIMES DAILY WITH MEALS
COMMUNITY
Start: 2020-08-16

## 2023-09-19 NOTE — ED PROVIDER NOTES
I had asked Michelle what his question was and she stated that it was in regards to a letter.   I tried to call him but he did not answer.  Left message for him that we had called.      Patient is a 58-year-old male with past medical history significant for psoriasis and prior herniated lumbar disc he notes that  He presents the emergency department for evaluation of injury sustained in a fall today. He was walking down some steps and slipped landing on his mid back and right side of his chest.  He notes significant pain in the middle of his back and with deep breaths. Patient states he may have briefly hit his head but does not have any headache and did not notice any abrasion or bleeding. He states he also hit his right elbow a little bit but denies any pain with range of motion. Patient states he has been able to walk without difficulty denies any pain in his hips or lower extremities. Of note patient states that he had some urinary incontinence associated with the fall but denies any seizure and does not think he was unconscious for any length of time. Patient denies any radiation of pain from his back down his legs or any weakness. Patient denies any urinary hesitancy or retention since the event.            Past Medical History:   Diagnosis Date    Herniated lumbar intervertebral disc     Psoriasis        Past Surgical History:   Procedure Laterality Date    HX HERNIA REPAIR           Family History:   Problem Relation Age of Onset    No Known Problems Mother     Hypertension Father     Clotting Disorder Father        Social History     Socioeconomic History    Marital status:      Spouse name: Not on file    Number of children: Not on file    Years of education: Not on file    Highest education level: Not on file   Occupational History    Not on file   Social Needs    Financial resource strain: Not on file    Food insecurity     Worry: Not on file     Inability: Not on file    Transportation needs     Medical: Not on file     Non-medical: Not on file   Tobacco Use    Smoking status: Former Smoker    Smokeless tobacco: Never Used    Tobacco comment: quit approx 13 yrs ago   Substance and Sexual Activity    Alcohol use: Yes     Alcohol/week: 5.0 standard drinks     Types: 5 Cans of beer per week    Drug use: Never    Sexual activity: Not on file   Lifestyle    Physical activity     Days per week: Not on file     Minutes per session: Not on file    Stress: Not on file   Relationships    Social connections     Talks on phone: Not on file     Gets together: Not on file     Attends [employer] service: Not on file     Active member of club or organization: Not on file     Attends meetings of clubs or organizations: Not on file     Relationship status: Not on file    Intimate partner violence     Fear of current or ex partner: Not on file     Emotionally abused: Not on file     Physically abused: Not on file     Forced sexual activity: Not on file   Other Topics Concern    Not on file   Social History Narrative    Not on file         ALLERGIES: Patient has no known allergies. Review of Systems   Constitutional: Negative for chills and fever. HENT: Positive for sinus pressure (occasionally). Negative for drooling, facial swelling, nosebleeds, postnasal drip, rhinorrhea, trouble swallowing and voice change. Eyes: Negative for photophobia and visual disturbance. Respiratory: Negative for apnea, cough, wheezing and stridor. Cardiovascular: Positive for chest pain (right chest wall pain). Gastrointestinal: Negative for abdominal pain, diarrhea, nausea and vomiting. Musculoskeletal: Positive for back pain and myalgias. Negative for neck pain and neck stiffness. Skin: Negative for color change and rash. Neurological: Negative for tremors, seizures, syncope, facial asymmetry, speech difficulty, weakness, numbness and headaches. Hematological: Does not bruise/bleed easily. Psychiatric/Behavioral: Negative.         Vitals:    08/16/20 1517 08/16/20 1530 08/16/20 1545   BP: 119/76 109/71 126/67   Pulse: 79     Resp: 18     Temp: 98.4 °F (36.9 °C)     SpO2: 97% 100% 97%   Weight: 80 kg (176 lb 5.9 oz)     Height: 5' 9\" (1.753 m)              Physical Exam  Vitals signs and nursing note reviewed. Constitutional:       Appearance: Normal appearance. He is not ill-appearing, toxic-appearing or diaphoretic. HENT:      Head: Normocephalic and atraumatic. Right Ear: External ear normal.      Left Ear: External ear normal.      Nose: Nose normal. No rhinorrhea. Eyes:      General: No scleral icterus. Extraocular Movements: Extraocular movements intact. Pupils: Pupils are equal, round, and reactive to light. Neck:      Musculoskeletal: Normal range of motion. No muscular tenderness. Cardiovascular:      Rate and Rhythm: Normal rate and regular rhythm. Pulses: Normal pulses. Pulmonary:      Effort: Pulmonary effort is normal.      Breath sounds: Normal breath sounds. Chest:      Chest wall: Tenderness (right lateral and posterior chest wall) present. No lacerations, deformity, swelling, crepitus or edema. Abdominal:      General: There is no distension. Palpations: Abdomen is soft. Tenderness: There is no abdominal tenderness. There is no guarding. Musculoskeletal:         General: No deformity. Back:       Right lower leg: No edema. Left lower leg: No edema. Skin:     General: Skin is warm and dry. Findings: No rash. Neurological:      General: No focal deficit present. Mental Status: He is alert and oriented to person, place, and time. Sensory: No sensory deficit. Motor: No weakness. Comments: Sensation and strength normal in extremities. Good dorsiflexion of great toes bilaterally. Psychiatric:         Mood and Affect: Mood normal.         Behavior: Behavior normal.         Thought Content:  Thought content normal.         Judgment: Judgment normal.          MDM  Number of Diagnoses or Management Options  Abnormal CT scan, sinus: new and requires workup  Contusion of right chest wall, initial encounter: new and requires workup  Fall, initial encounter: new and requires workup  Strain of lumbar region, initial encounter: new and requires workup  Thoracic myofascial strain, initial encounter: new and requires workup  Diagnosis management comments: Patient advised of the abnormal findings of the right maxillary sinus. He states occasionally does have some sinus congestion but denies any pain in the maxillary sinus region. Patient denies any sinus drainage or postnasal drip or nasal drainage. Patient denies any recent fevers or chills. Case discussed with ENT who will see the patient in follow-up. No evidence of any traumatic injury to his face to suggest any bleeding into the right sinus. No ecchymosis noted on the face. Patient given strict warning signs and symptoms return to emergency department.        Amount and/or Complexity of Data Reviewed  Tests in the radiology section of CPT®: ordered and reviewed  Discuss the patient with other providers: yes           Procedures Azelaic Acid Pregnancy And Lactation Text: This medication is considered safe during pregnancy and breast feeding.